# Patient Record
Sex: FEMALE | Race: WHITE | Employment: OTHER | URBAN - METROPOLITAN AREA
[De-identification: names, ages, dates, MRNs, and addresses within clinical notes are randomized per-mention and may not be internally consistent; named-entity substitution may affect disease eponyms.]

---

## 2019-04-25 ENCOUNTER — HOSPITAL ENCOUNTER (OUTPATIENT)
Facility: CLINIC | Age: 69
Setting detail: OBSERVATION
Discharge: HOME | End: 2019-04-26
Attending: EMERGENCY MEDICINE | Admitting: INTERNAL MEDICINE
Payer: MEDICARE

## 2019-04-25 DIAGNOSIS — A41.9 SEPSIS, DUE TO UNSPECIFIED ORGANISM: ICD-10-CM

## 2019-04-25 DIAGNOSIS — E10.10 TYPE 1 DIABETES MELLITUS WITH KETOACIDOSIS WITHOUT COMA (H): Primary | ICD-10-CM

## 2019-04-25 DIAGNOSIS — R73.9 HYPERGLYCEMIA: ICD-10-CM

## 2019-04-25 LAB
ALBUMIN SERPL-MCNC: 3.9 G/DL (ref 3.4–5)
ALBUMIN UR-MCNC: NEGATIVE MG/DL
ALP SERPL-CCNC: 173 U/L (ref 40–150)
ALT SERPL W P-5'-P-CCNC: 21 U/L (ref 0–50)
ANION GAP SERPL CALCULATED.3IONS-SCNC: 15 MMOL/L (ref 3–14)
APPEARANCE UR: CLEAR
AST SERPL W P-5'-P-CCNC: 13 U/L (ref 0–45)
BACTERIA #/AREA URNS HPF: ABNORMAL /HPF
BASE DEFICIT BLDV-SCNC: 3.9 MMOL/L
BASOPHILS # BLD AUTO: 0.1 10E9/L (ref 0–0.2)
BASOPHILS NFR BLD AUTO: 0.2 %
BILIRUB SERPL-MCNC: 0.8 MG/DL (ref 0.2–1.3)
BILIRUB UR QL STRIP: NEGATIVE
BUN SERPL-MCNC: 24 MG/DL (ref 7–30)
CALCIUM SERPL-MCNC: 10 MG/DL (ref 8.5–10.1)
CHLORIDE SERPL-SCNC: 99 MMOL/L (ref 94–109)
CO2 SERPL-SCNC: 19 MMOL/L (ref 20–32)
COLOR UR AUTO: ABNORMAL
CREAT SERPL-MCNC: 0.78 MG/DL (ref 0.52–1.04)
DIFFERENTIAL METHOD BLD: ABNORMAL
EOSINOPHIL # BLD AUTO: 0 10E9/L (ref 0–0.7)
EOSINOPHIL NFR BLD AUTO: 0.2 %
ERYTHROCYTE [DISTWIDTH] IN BLOOD BY AUTOMATED COUNT: 13.3 % (ref 10–15)
GFR SERPL CREATININE-BSD FRML MDRD: 78 ML/MIN/{1.73_M2}
GLUCOSE BLDC GLUCOMTR-MCNC: 134 MG/DL (ref 70–99)
GLUCOSE BLDC GLUCOMTR-MCNC: 274 MG/DL (ref 70–99)
GLUCOSE BLDC GLUCOMTR-MCNC: 295 MG/DL (ref 70–99)
GLUCOSE SERPL-MCNC: 570 MG/DL (ref 70–99)
GLUCOSE UR STRIP-MCNC: >1000 MG/DL
HBA1C MFR BLD: 6.6 % (ref 0–5.6)
HCO3 BLDV-SCNC: 22 MMOL/L (ref 21–28)
HCT VFR BLD AUTO: 44.4 % (ref 35–47)
HGB BLD-MCNC: 15.2 G/DL (ref 11.7–15.7)
HGB UR QL STRIP: ABNORMAL
IMM GRANULOCYTES # BLD: 0.1 10E9/L (ref 0–0.4)
IMM GRANULOCYTES NFR BLD: 0.3 %
INTERPRETATION ECG - MUSE: NORMAL
KETONES BLD-SCNC: 3 MMOL/L (ref 0–0.6)
KETONES UR STRIP-MCNC: 40 MG/DL
LACTATE BLD-SCNC: 1.2 MMOL/L (ref 0.7–2)
LACTATE BLD-SCNC: 2.6 MMOL/L (ref 0.7–2)
LACTATE SERPL-SCNC: 2.5 MMOL/L (ref 0.4–2)
LEUKOCYTE ESTERASE UR QL STRIP: NEGATIVE
LYMPHOCYTES # BLD AUTO: 1.5 10E9/L (ref 0.8–5.3)
LYMPHOCYTES NFR BLD AUTO: 6.6 %
MCH RBC QN AUTO: 32.1 PG (ref 26.5–33)
MCHC RBC AUTO-ENTMCNC: 34.2 G/DL (ref 31.5–36.5)
MCV RBC AUTO: 94 FL (ref 78–100)
MONOCYTES # BLD AUTO: 0.6 10E9/L (ref 0–1.3)
MONOCYTES NFR BLD AUTO: 2.8 %
NEUTROPHILS # BLD AUTO: 19.7 10E9/L (ref 1.6–8.3)
NEUTROPHILS NFR BLD AUTO: 89.9 %
NITRATE UR QL: NEGATIVE
NRBC # BLD AUTO: 0 10*3/UL
NRBC BLD AUTO-RTO: 0 /100
OXYHGB MFR BLDV: 82 %
PCO2 BLDV: 41 MM HG (ref 40–50)
PH BLDV: 7.34 PH (ref 7.32–7.43)
PH UR STRIP: 5 PH (ref 5–7)
PLATELET # BLD AUTO: 287 10E9/L (ref 150–450)
PO2 BLDV: 52 MM HG (ref 25–47)
POTASSIUM SERPL-SCNC: 4.3 MMOL/L (ref 3.4–5.3)
PROT SERPL-MCNC: 7.8 G/DL (ref 6.8–8.8)
RBC # BLD AUTO: 4.74 10E12/L (ref 3.8–5.2)
RBC #/AREA URNS AUTO: 1 /HPF (ref 0–2)
SODIUM SERPL-SCNC: 133 MMOL/L (ref 133–144)
SOURCE: ABNORMAL
SP GR UR STRIP: 1.02 (ref 1–1.03)
SQUAMOUS #/AREA URNS AUTO: <1 /HPF (ref 0–1)
UROBILINOGEN UR STRIP-MCNC: NORMAL MG/DL (ref 0–2)
WBC # BLD AUTO: 21.8 10E9/L (ref 4–11)
WBC #/AREA URNS AUTO: <1 /HPF (ref 0–5)

## 2019-04-25 PROCEDURE — 93005 ELECTROCARDIOGRAM TRACING: CPT

## 2019-04-25 PROCEDURE — 25000132 ZZH RX MED GY IP 250 OP 250 PS 637: Mod: GY | Performed by: INTERNAL MEDICINE

## 2019-04-25 PROCEDURE — 83605 ASSAY OF LACTIC ACID: CPT | Performed by: INTERNAL MEDICINE

## 2019-04-25 PROCEDURE — 96361 HYDRATE IV INFUSION ADD-ON: CPT

## 2019-04-25 PROCEDURE — 96365 THER/PROPH/DIAG IV INF INIT: CPT

## 2019-04-25 PROCEDURE — 25000131 ZZH RX MED GY IP 250 OP 636 PS 637: Performed by: EMERGENCY MEDICINE

## 2019-04-25 PROCEDURE — 82010 KETONE BODYS QUAN: CPT | Performed by: EMERGENCY MEDICINE

## 2019-04-25 PROCEDURE — 25800030 ZZH RX IP 258 OP 636: Performed by: INTERNAL MEDICINE

## 2019-04-25 PROCEDURE — 85025 COMPLETE CBC W/AUTO DIFF WBC: CPT | Performed by: EMERGENCY MEDICINE

## 2019-04-25 PROCEDURE — 83605 ASSAY OF LACTIC ACID: CPT | Performed by: EMERGENCY MEDICINE

## 2019-04-25 PROCEDURE — 81001 URINALYSIS AUTO W/SCOPE: CPT | Performed by: EMERGENCY MEDICINE

## 2019-04-25 PROCEDURE — 99285 EMERGENCY DEPT VISIT HI MDM: CPT | Mod: 25

## 2019-04-25 PROCEDURE — 99219 ZZC INITIAL OBSERVATION CARE,LEVL II: CPT | Performed by: INTERNAL MEDICINE

## 2019-04-25 PROCEDURE — 99207 ZZC CDG-CODE CATEGORY CHANGED: CPT | Performed by: INTERNAL MEDICINE

## 2019-04-25 PROCEDURE — 36415 COLL VENOUS BLD VENIPUNCTURE: CPT | Performed by: INTERNAL MEDICINE

## 2019-04-25 PROCEDURE — 83036 HEMOGLOBIN GLYCOSYLATED A1C: CPT | Performed by: EMERGENCY MEDICINE

## 2019-04-25 PROCEDURE — 25000128 H RX IP 250 OP 636: Performed by: EMERGENCY MEDICINE

## 2019-04-25 PROCEDURE — 87040 BLOOD CULTURE FOR BACTERIA: CPT | Mod: 91 | Performed by: EMERGENCY MEDICINE

## 2019-04-25 PROCEDURE — 12000000 ZZH R&B MED SURG/OB

## 2019-04-25 PROCEDURE — 25000131 ZZH RX MED GY IP 250 OP 636 PS 637: Performed by: INTERNAL MEDICINE

## 2019-04-25 PROCEDURE — 25000128 H RX IP 250 OP 636: Performed by: INTERNAL MEDICINE

## 2019-04-25 PROCEDURE — 80053 COMPREHEN METABOLIC PANEL: CPT | Performed by: EMERGENCY MEDICINE

## 2019-04-25 PROCEDURE — 96372 THER/PROPH/DIAG INJ SC/IM: CPT

## 2019-04-25 PROCEDURE — 00000146 ZZHCL STATISTIC GLUCOSE BY METER IP

## 2019-04-25 PROCEDURE — 82805 BLOOD GASES W/O2 SATURATION: CPT | Performed by: EMERGENCY MEDICINE

## 2019-04-25 RX ORDER — LATANOPROST 50 UG/ML
1 SOLUTION/ DROPS OPHTHALMIC AT BEDTIME
Status: DISCONTINUED | OUTPATIENT
Start: 2019-04-25 | End: 2019-04-26 | Stop reason: HOSPADM

## 2019-04-25 RX ORDER — NALOXONE HYDROCHLORIDE 0.4 MG/ML
.1-.4 INJECTION, SOLUTION INTRAMUSCULAR; INTRAVENOUS; SUBCUTANEOUS
Status: DISCONTINUED | OUTPATIENT
Start: 2019-04-25 | End: 2019-04-26 | Stop reason: HOSPADM

## 2019-04-25 RX ORDER — PROCHLORPERAZINE MALEATE 5 MG
5 TABLET ORAL EVERY 6 HOURS PRN
Status: DISCONTINUED | OUTPATIENT
Start: 2019-04-25 | End: 2019-04-26 | Stop reason: HOSPADM

## 2019-04-25 RX ORDER — CEFTRIAXONE 2 G/1
2 INJECTION, POWDER, FOR SOLUTION INTRAMUSCULAR; INTRAVENOUS ONCE
Status: COMPLETED | OUTPATIENT
Start: 2019-04-25 | End: 2019-04-25

## 2019-04-25 RX ORDER — MONTELUKAST SODIUM 4 MG/1
1 TABLET, CHEWABLE ORAL 2 TIMES DAILY
Status: DISCONTINUED | OUTPATIENT
Start: 2019-04-25 | End: 2019-04-26 | Stop reason: HOSPADM

## 2019-04-25 RX ORDER — DEXTROSE MONOHYDRATE 25 G/50ML
25-50 INJECTION, SOLUTION INTRAVENOUS
Status: DISCONTINUED | OUTPATIENT
Start: 2019-04-25 | End: 2019-04-26 | Stop reason: HOSPADM

## 2019-04-25 RX ORDER — SODIUM CHLORIDE 9 MG/ML
INJECTION, SOLUTION INTRAVENOUS CONTINUOUS
Status: DISCONTINUED | OUTPATIENT
Start: 2019-04-25 | End: 2019-04-25 | Stop reason: CLARIF

## 2019-04-25 RX ORDER — LATANOPROST 50 UG/ML
1 SOLUTION/ DROPS OPHTHALMIC AT BEDTIME
COMMUNITY

## 2019-04-25 RX ORDER — ASPIRIN 81 MG/1
81 TABLET ORAL DAILY
Status: DISCONTINUED | OUTPATIENT
Start: 2019-04-26 | End: 2019-04-26 | Stop reason: HOSPADM

## 2019-04-25 RX ORDER — LISINOPRIL 2.5 MG/1
2.5 TABLET ORAL DAILY
Status: DISCONTINUED | OUTPATIENT
Start: 2019-04-26 | End: 2019-04-26 | Stop reason: HOSPADM

## 2019-04-25 RX ORDER — ONDANSETRON 4 MG/1
4 TABLET, ORALLY DISINTEGRATING ORAL EVERY 6 HOURS PRN
Status: DISCONTINUED | OUTPATIENT
Start: 2019-04-25 | End: 2019-04-26

## 2019-04-25 RX ORDER — DEXTROSE MONOHYDRATE, SODIUM CHLORIDE, AND POTASSIUM CHLORIDE 50; 1.49; 4.5 G/1000ML; G/1000ML; G/1000ML
INJECTION, SOLUTION INTRAVENOUS CONTINUOUS
Status: DISCONTINUED | OUTPATIENT
Start: 2019-04-25 | End: 2019-04-26

## 2019-04-25 RX ORDER — ONDANSETRON 2 MG/ML
4 INJECTION INTRAMUSCULAR; INTRAVENOUS EVERY 6 HOURS PRN
Status: DISCONTINUED | OUTPATIENT
Start: 2019-04-25 | End: 2019-04-26

## 2019-04-25 RX ORDER — ONDANSETRON 2 MG/ML
4 INJECTION INTRAMUSCULAR; INTRAVENOUS EVERY 6 HOURS PRN
Status: DISCONTINUED | OUTPATIENT
Start: 2019-04-25 | End: 2019-04-26 | Stop reason: HOSPADM

## 2019-04-25 RX ORDER — NICOTINE POLACRILEX 4 MG
15-30 LOZENGE BUCCAL
Status: DISCONTINUED | OUTPATIENT
Start: 2019-04-25 | End: 2019-04-26 | Stop reason: HOSPADM

## 2019-04-25 RX ORDER — ONDANSETRON 4 MG/1
4 TABLET, ORALLY DISINTEGRATING ORAL EVERY 6 HOURS PRN
Status: DISCONTINUED | OUTPATIENT
Start: 2019-04-25 | End: 2019-04-26 | Stop reason: HOSPADM

## 2019-04-25 RX ORDER — PROCHLORPERAZINE 25 MG
12.5 SUPPOSITORY, RECTAL RECTAL EVERY 12 HOURS PRN
Status: DISCONTINUED | OUTPATIENT
Start: 2019-04-25 | End: 2019-04-26 | Stop reason: HOSPADM

## 2019-04-25 RX ADMIN — LATANOPROST 1 DROP: 50 SOLUTION/ DROPS OPHTHALMIC at 21:35

## 2019-04-25 RX ADMIN — SODIUM CHLORIDE 1000 ML: 9 INJECTION, SOLUTION INTRAVENOUS at 15:11

## 2019-04-25 RX ADMIN — SODIUM CHLORIDE 1000 ML: 9 INJECTION, SOLUTION INTRAVENOUS at 12:12

## 2019-04-25 RX ADMIN — INSULIN DETEMIR 10 UNITS: 100 INJECTION, SOLUTION SUBCUTANEOUS at 21:35

## 2019-04-25 RX ADMIN — SODIUM CHLORIDE: 9 INJECTION, SOLUTION INTRAVENOUS at 19:57

## 2019-04-25 RX ADMIN — CEFTRIAXONE SODIUM 2 G: 2 INJECTION, POWDER, FOR SOLUTION INTRAMUSCULAR; INTRAVENOUS at 13:18

## 2019-04-25 RX ADMIN — ONDANSETRON 4 MG: 2 INJECTION INTRAMUSCULAR; INTRAVENOUS at 21:40

## 2019-04-25 RX ADMIN — SODIUM CHLORIDE 1000 ML: 9 INJECTION, SOLUTION INTRAVENOUS at 13:19

## 2019-04-25 RX ADMIN — POTASSIUM CHLORIDE, DEXTROSE MONOHYDRATE AND SODIUM CHLORIDE: 150; 5; 450 INJECTION, SOLUTION INTRAVENOUS at 23:33

## 2019-04-25 RX ADMIN — HUMAN INSULIN 10 UNITS: 100 INJECTION, SOLUTION SUBCUTANEOUS at 13:56

## 2019-04-25 ASSESSMENT — ENCOUNTER SYMPTOMS
FREQUENCY: 1
FATIGUE: 1
VOMITING: 1
NAUSEA: 1

## 2019-04-25 ASSESSMENT — ACTIVITIES OF DAILY LIVING (ADL)
RETIRED_EATING: 0-->INDEPENDENT
COGNITION: 0 - NO COGNITION ISSUES REPORTED
DRESS: 0-->INDEPENDENT
ADLS_ACUITY_SCORE: 14
SWALLOWING: 0-->SWALLOWS FOODS/LIQUIDS WITHOUT DIFFICULTY
AMBULATION: 0-->INDEPENDENT
TRANSFERRING: 0-->INDEPENDENT
FALL_HISTORY_WITHIN_LAST_SIX_MONTHS: NO
RETIRED_COMMUNICATION: 0-->UNDERSTANDS/COMMUNICATES WITHOUT DIFFICULTY
TOILETING: 0-->INDEPENDENT
BATHING: 0-->INDEPENDENT

## 2019-04-25 ASSESSMENT — MIFFLIN-ST. JEOR: SCORE: 1097.88

## 2019-04-25 NOTE — PHARMACY-ADMISSION MEDICATION HISTORY
Admission medication history interview status for the 4/25/2019  admission is complete. See EPIC admission navigator for prior to admission medications     Medication history source reliability:Good    Actions taken by pharmacist (provider contacted, etc):None     Additional medication history information not noted on PTA med list :None    Medication reconciliation/reorder completed by provider prior to medication history? No    Time spent in this activity: 30 minutes    Prior to Admission medications    Medication Sig Last Dose Taking? Auth Provider   aspirin 81 MG tablet Take 81 mg by mouth daily  4/24/2019 at Unknown time Yes Reported, Patient   colestipol (COLESTID) 1 G tablet Take 1 g by mouth 2 times daily 4/24/2019 at Unknown time Yes Reported, Patient   insulin aspart (NOVOLOG VIAL) SOLN 100 UNIT/ML Inject Subcutaneous See Admin Instructions  Yes Reported, Patient   latanoprost (XALATAN) 0.005 % ophthalmic solution Place 1 drop into both eyes At Bedtime 4/24/2019 at Unknown time Yes Unknown, Entered By History   lisinopril (ZESTRIL) 2.5 MG tablet Take 2.5 mg by mouth daily 4/24/2019 at Unknown time Yes Reported, Patient

## 2019-04-25 NOTE — ED NOTES
"[facility]  ED Nurse Handoff Report    ED Chief complaint: Hyperglycemia (Insulin pump is not working. )      ED Diagnosis:   Final diagnoses:   Hyperglycemia   Sepsis, due to unspecified organism (H)       Code Status: Full Code    Allergies:   Allergies   Allergen Reactions     Sulfamethoxazole-Trimethoprim      myalgia     Glucosamine-Chondroitin Nausea and Vomiting     Makes patient extremely sick       Activity level - Baseline/Home:  Independent    Activity Level - Current:   Independent     Needed?: No    Isolation: No  Infection: Not Applicable  Bariatric?: No    Vital Signs:   Vitals:    04/25/19 1146 04/25/19 1230 04/25/19 1245   BP: (!) 190/102 146/78    Pulse:  100    Resp: 16     Temp: 97.7  F (36.5  C)     TempSrc: Oral     SpO2: 96%  93%   Weight: 56.7 kg (125 lb)     Height: 1.651 m (5' 5\")         Cardiac Rhythm: ,        Pain level:      Is this patient confused?: No   Does this patient have a guardian?  No         If yes, is there guardianship documents in the Epic \"Code/ACP\" activity?  N/A         Guardian Notified?  N/A  [place] - Suicide Severity Rating Scale Completed?  Yes  If yes, what color did the patient score?  White    Patient Report: Initial Complaint: hyperglycemia  Focused Assessment:Rosa Maria Khan is a 68 year old female who presents with hyperglycemia.  The patient is a type I diabetic, and has an insulin pump.  She states that she change the cartridge last night, but does not think is working because all of her readings have been high.  She states she has been urinating frequently today, and that she is also vomited a couple times.  She feels terrible she states, but no focal pain.  Symptoms are constant, moderate in severity, nonradiating.  She denies any fevers, denies any chest pain.  States that since having had the insulin pump placed, she had no issues with DKA.    Tests Performed: labs  Abnormal Results:   Labs Ordered and Resulted from Time of " ED Arrival Up to the Time of Departure from the ED   CBC WITH PLATELETS DIFFERENTIAL - Abnormal; Notable for the following components:       Result Value    WBC 21.8 (*)     Absolute Neutrophil 19.7 (*)     All other components within normal limits   COMPREHENSIVE METABOLIC PANEL - Abnormal; Notable for the following components:    Carbon Dioxide 19 (*)     Anion Gap 15 (*)     Glucose 570 (*)     Alkaline Phosphatase 173 (*)     All other components within normal limits   LACTIC ACID WHOLE BLOOD - Abnormal; Notable for the following components:    Lactic Acid 2.6 (*)     All other components within normal limits   BLOOD GAS VENOUS AND OXYHGB - Abnormal; Notable for the following components:    PO2 Venous 52 (*)     All other components within normal limits   KETONE BETA-HYDROXYBUTYRATE QUANTITATIVE - Abnormal; Notable for the following components:    Ketone Quantitative 3.0 (*)     All other components within normal limits   ROUTINE UA WITH MICROSCOPIC - Abnormal; Notable for the following components:    Glucose Urine >1000 (*)     Ketones Urine 40 (*)     Blood Urine Trace (*)     Bacteria Urine Few (*)     All other components within normal limits   BLOOD CULTURE   BLOOD CULTURE       Treatments provided: Rocephin, Insulin, NS bolus,     Family Comments: na    OBS brochure/video discussed/provided to patient/family: N/A              Name of person given brochure if not patient: na              Relationship to patient: na    ED Medications:   Medications   0.9% sodium chloride BOLUS (1,000 mLs Intravenous New Bag 4/25/19 1212)     Followed by   0.9% sodium chloride BOLUS (1,000 mLs Intravenous New Bag 4/25/19 1319)   cefTRIAXone (ROCEPHIN) 2 g vial to attach to  ml bag for ADULTS or NS 50 ml bag for PEDS (2 g Intravenous New Bag 4/25/19 1318)   insulin regular (HumuLIN R/NovoLIN R VIAL) injection 10 Units (has no administration in time range)       Drips infusing?:  No    For the majority of the shift this  patient was Green.   Interventions performed were none.    Severe Sepsis OR Septic Shock Diagnosis Present: yes      To be done/followed up on inpatient unit:  close monitoring    ED NURSE PHONE NUMBER: [phone]

## 2019-04-25 NOTE — PROGRESS NOTES
RECEIVING UNIT ED HANDOFF REVIEW    ED Nurse Handoff Report was reviewed by: Florida Turk on April 25, 2019 at 2:13 PM

## 2019-04-25 NOTE — ED TRIAGE NOTES
Blood sugar has been greater than 500 at home, believes her insulin pump is not working since she replaced insulin cartridge last night. Blood sugar has been keep going up. Visiting from [place].

## 2019-04-25 NOTE — ED PROVIDER NOTES
History     Chief Complaint:  Hyperglycemia (Insulin pump is not working. )       TONY Khan is a 68 year old female who presents with hyperglycemia.  The patient is a type I diabetic, and has an insulin pump.  She states that she change the cartridge last night, but does not think is working because all of her readings have been high.  She states she has been urinating frequently today, and that she is also vomited a couple times.  She feels terrible she states, but no focal pain.  Symptoms are constant, moderate in severity, nonradiating.  She denies any fevers, denies any chest pain.  States that since having had the insulin pump placed, she had no issues with DKA.    Allergies:  Sulfamethoxazole-Trimethoprim  Glucosamine-Chondroitin     Medications:      aspirin 81 MG tablet   buPROPion (WELLBUTRIN XL) 150 MG 24 hr tablet   busPIRone (BUSPAR) 5 MG tablet   citalopram (CELEXA) 20 MG tablet   clobetasol (TEMOVATE) 0.05 % external solution   colestipol (COLESTID) 1 G tablet   Fish Oil-Cholecalciferol (FISH OIL + D3) 0243-2395 MG-UNIT CAPS   insulin aspart (NOVOLOG VIAL) SOLN 100 UNIT/ML   insulin glargine (LANTUS VIAL) SOLN 100 UNIT/ML   levothyroxine (SYNTHROID) 100 MCG tablet   lisinopril (ZESTRIL) 2.5 MG tablet   minoxidil (TGT MINOXIDIL HAIR REGROWTH) 5 % SOLN   Multiple Minerals-Vitamins (CALCIUM & VIT D3 BONE HEALTH PO)   tretinoin (RETIN-A) 0.025 % cream       Past Medical History:    No past medical history on file.    Patient Active Problem List    Diagnosis Date Noted     Alopecia 04/18/2014     Priority: Medium     Problem list name updated by automated process. Provider to review          Past Surgical History:    No past surgical history on file.     Family History:    family history includes Cancer in her mother and another family member; Cancer - colorectal in her brother; Diabetes in her brother; Parkinsonism in her father; Thyroid Disease in her father and mother.    Social History:    "reports that she has been smoking.  She has never used smokeless tobacco. She reports that she drinks alcohol. She reports that she does not use drugs.    PCP: No primary care provider on file.     Review of Systems   Constitutional: Positive for fatigue.   Gastrointestinal: Positive for nausea and vomiting.   Genitourinary: Positive for frequency.   All other systems reviewed and are negative.        Physical Exam     Patient Vitals for the past 24 hrs:   BP Temp Temp src Heart Rate Resp SpO2 Height Weight   04/25/19 1146 (!) 190/102 97.7  F (36.5  C) Oral 120 16 96 % 1.651 m (5' 5\") 56.7 kg (125 lb)        Physical Exam   Vitals: reviewed by me  General: Pt seen on [facility], pleasant, cooperative, and alert to conversation.  + sick appearing, non toxic   Eyes: Tracking well, clear conjunctiva BL  ENT: MMM, midline trachea.   Lungs:   No tachypnea, no accessory muscle use. No respiratory distress.   CV: Rate as above, regular rhythm.    Abd: Soft, non tender, no guarding, no rebound. Non distended  MSK: no peripheral edema or joint effusion.  No evidence of trauma  Skin: No rash, normal turgor and temperature  Neuro: Clear speech and no facial droop.  Psych: Not RIS, no e/o AH/VH      Emergency Department Course     ECG   Sinus tachycardia, normal intervals, no ST changes, no ectopy.      Laboratory:  Labs Ordered and Resulted from Time of ED Arrival Up to the Time of Departure from the ED   CBC WITH PLATELETS DIFFERENTIAL - Abnormal; Notable for the following components:       Result Value    WBC 21.8 (*)     Absolute Neutrophil 19.7 (*)     All other components within normal limits   COMPREHENSIVE METABOLIC PANEL - Abnormal; Notable for the following components:    Carbon Dioxide 19 (*)     Anion Gap 15 (*)     Glucose 570 (*)     Alkaline Phosphatase 173 (*)     All other components within normal limits   LACTIC ACID WHOLE BLOOD - Abnormal; Notable for the following components:    Lactic Acid 2.6 (*)     " All other components within normal limits   BLOOD GAS VENOUS AND OXYHGB - Abnormal; Notable for the following components:    PO2 Venous 52 (*)     All other components within normal limits   KETONE BETA-HYDROXYBUTYRATE QUANTITATIVE - Abnormal; Notable for the following components:    Ketone Quantitative 3.0 (*)     All other components within normal limits   ROUTINE UA WITH MICROSCOPIC - Abnormal; Notable for the following components:    Glucose Urine >1000 (*)     Ketones Urine 40 (*)     Blood Urine Trace (*)     Bacteria Urine Few (*)     All other components within normal limits   BLOOD CULTURE   BLOOD CULTURE        Interventions:  Medications   0.9% sodium chloride BOLUS (1,000 mLs Intravenous New Bag 4/25/19 1212)     Followed by   0.9% sodium chloride BOLUS (has no administration in time range)        Impression & Plan         Medical Decision Making:  This is a very pleasant but unfortunate 68-year-old female presents the emergency room with appears to be hyperglycemia, possibly sepsis.  She states she feels unwell, and has several critical values, likely stemming from a malfunctioning insulin pump.  Her glucose is critically high, she has critically high ketones as well, and an elevated lactate and white count and heart rate.  If these reasons, I did elect to treat empirically for sepsis, with the again she is afebrile, mentating appropriately, and has stabilizing vitals here after IV fluids only.  The main issue does appear to be her insulin pump, and all of her symptoms can be explained by hyperglycemia.  She has no evidence of acidosis yet apart from a slightly low bicarb, but a normal pH.  After speaking with the hospitalist, we did decide to give an insulin dose here as well as copious IV fluids, as again patient is not sick enough to require an IV insulin drip as yet.  She will be monitored very carefully, and be admitted to medical floor for continued monitoring and treatment of her hyperglycemia,  and monitoring for any type of fever or source for a septic infection.  Patient is okay with this plan, resting comfortably now, no pain, okay for an abdomen as above.      Critical Care time:  was 35 minutes for this patient excluding procedures.    Diagnosis:    ICD-10-CM    1. Hyperglycemia R73.9 Comprehensive metabolic panel     UA with Microscopic   2. Sepsis, due to unspecified organism (H) A41.9         Discharge Medications:  New Prescriptions    No medications on file        4/25/2019   Dimas Soliman*        Dimas Soliman MD  04/25/19 1318

## 2019-04-26 VITALS
BODY MASS INDEX: 20.83 KG/M2 | HEART RATE: 100 BPM | DIASTOLIC BLOOD PRESSURE: 74 MMHG | TEMPERATURE: 97 F | RESPIRATION RATE: 18 BRPM | SYSTOLIC BLOOD PRESSURE: 169 MMHG | WEIGHT: 125 LBS | HEIGHT: 65 IN | OXYGEN SATURATION: 96 %

## 2019-04-26 PROBLEM — R73.9 HYPERGLYCEMIA: Status: ACTIVE | Noted: 2019-04-26

## 2019-04-26 LAB
ANION GAP SERPL CALCULATED.3IONS-SCNC: 10 MMOL/L (ref 3–14)
BUN SERPL-MCNC: 17 MG/DL (ref 7–30)
CALCIUM SERPL-MCNC: 8.5 MG/DL (ref 8.5–10.1)
CHLORIDE SERPL-SCNC: 108 MMOL/L (ref 94–109)
CO2 SERPL-SCNC: 21 MMOL/L (ref 20–32)
CREAT SERPL-MCNC: 0.65 MG/DL (ref 0.52–1.04)
ERYTHROCYTE [DISTWIDTH] IN BLOOD BY AUTOMATED COUNT: 13.5 % (ref 10–15)
GFR SERPL CREATININE-BSD FRML MDRD: >90 ML/MIN/{1.73_M2}
GLUCOSE BLDC GLUCOMTR-MCNC: 181 MG/DL (ref 70–99)
GLUCOSE BLDC GLUCOMTR-MCNC: 239 MG/DL (ref 70–99)
GLUCOSE BLDC GLUCOMTR-MCNC: 248 MG/DL (ref 70–99)
GLUCOSE BLDC GLUCOMTR-MCNC: 299 MG/DL (ref 70–99)
GLUCOSE BLDC GLUCOMTR-MCNC: 355 MG/DL (ref 70–99)
GLUCOSE BLDC GLUCOMTR-MCNC: 474 MG/DL (ref 70–99)
GLUCOSE SERPL-MCNC: 209 MG/DL (ref 70–99)
HCT VFR BLD AUTO: 37.4 % (ref 35–47)
HGB BLD-MCNC: 13 G/DL (ref 11.7–15.7)
KETONES UR STRIP-MCNC: 10 MG/DL
MCH RBC QN AUTO: 32.2 PG (ref 26.5–33)
MCHC RBC AUTO-ENTMCNC: 34.8 G/DL (ref 31.5–36.5)
MCV RBC AUTO: 93 FL (ref 78–100)
PLATELET # BLD AUTO: 241 10E9/L (ref 150–450)
POTASSIUM SERPL-SCNC: 3.9 MMOL/L (ref 3.4–5.3)
RBC # BLD AUTO: 4.04 10E12/L (ref 3.8–5.2)
SODIUM SERPL-SCNC: 139 MMOL/L (ref 133–144)
WBC # BLD AUTO: 12.7 10E9/L (ref 4–11)

## 2019-04-26 PROCEDURE — 25000132 ZZH RX MED GY IP 250 OP 250 PS 637: Performed by: INTERNAL MEDICINE

## 2019-04-26 PROCEDURE — 25000131 ZZH RX MED GY IP 250 OP 636 PS 637: Performed by: INTERNAL MEDICINE

## 2019-04-26 PROCEDURE — 36415 COLL VENOUS BLD VENIPUNCTURE: CPT | Performed by: INTERNAL MEDICINE

## 2019-04-26 PROCEDURE — 99217 ZZC OBSERVATION CARE DISCHARGE: CPT | Performed by: INTERNAL MEDICINE

## 2019-04-26 PROCEDURE — 00000146 ZZHCL STATISTIC GLUCOSE BY METER IP

## 2019-04-26 PROCEDURE — 85027 COMPLETE CBC AUTOMATED: CPT | Performed by: INTERNAL MEDICINE

## 2019-04-26 PROCEDURE — G0378 HOSPITAL OBSERVATION PER HR: HCPCS

## 2019-04-26 PROCEDURE — A9270 NON-COVERED ITEM OR SERVICE: HCPCS | Performed by: INTERNAL MEDICINE

## 2019-04-26 PROCEDURE — 80048 BASIC METABOLIC PNL TOTAL CA: CPT | Performed by: INTERNAL MEDICINE

## 2019-04-26 PROCEDURE — 81003 URINALYSIS AUTO W/O SCOPE: CPT | Performed by: INTERNAL MEDICINE

## 2019-04-26 RX ORDER — ONDANSETRON 4 MG/1
4-8 TABLET, ORALLY DISINTEGRATING ORAL EVERY 8 HOURS PRN
Qty: 20 TABLET | Refills: 0 | Status: SHIPPED | OUTPATIENT
Start: 2019-04-26

## 2019-04-26 RX ADMIN — MONTELUKAST SODIUM 1 G: 4 TABLET, CHEWABLE ORAL at 08:00

## 2019-04-26 RX ADMIN — LISINOPRIL 2.5 MG: 2.5 TABLET ORAL at 08:00

## 2019-04-26 RX ADMIN — INSULIN ASPART 7 UNITS: 100 INJECTION, SOLUTION INTRAVENOUS; SUBCUTANEOUS at 12:15

## 2019-04-26 RX ADMIN — ASPIRIN 81 MG: 81 TABLET, COATED ORAL at 08:00

## 2019-04-26 RX ADMIN — INSULIN DETEMIR 10 UNITS: 100 INJECTION, SOLUTION SUBCUTANEOUS at 08:00

## 2019-04-26 RX ADMIN — INSULIN ASPART 2 UNITS: 100 INJECTION, SOLUTION INTRAVENOUS; SUBCUTANEOUS at 07:53

## 2019-04-26 ASSESSMENT — ACTIVITIES OF DAILY LIVING (ADL)
ADLS_ACUITY_SCORE: 14

## 2019-04-26 NOTE — PLAN OF CARE
AOx4, up independently in room. Tolerating Mod carb diet, BGM- blood sugars in 200's, sliding scale insulin given. Lactic decreased from 2.5 to 1.2. D5 1/2 NS + 20K at 50ml/hr. VSS ex HTN and tachy at times. 1 emesis, gave Zofran x1 with relief. BS active (+) flatus, 1 BM on shift. Discharge possibly today.

## 2019-04-26 NOTE — PROGRESS NOTES
[facility]    Hospitalist Progress Note    Date of Service (when I saw the patient): 04/26/2019    Assessment & Plan   Rosa Maria Khan is a 68 year old female who was admitted on 4/25/2019.  ASSESSMENT:  Rosa Maria Khan is a type 1 diabetic, who likely has a pump failure in mild DKA, admitted for further stabilization of her blood glucose and with hyperglycemia, ketonemia, leukocytosis and glucosuria.      PLAN:   1.  Mild DKA.  The patient denies any current infectious symptoms.  She has not been sick until her pump failure.  She has had prior problems with the pump and primarily it is usually either the tubing from the insulin pump or the connection.  The patient does not have any other insulin supply right now, and she has to get back to her home in [place].  The patient was started on Levemir 10 units b.i.d.  Blood sugars in the 200s today except this afternoon value at 474   stopped D5 in IVF   Added novolog 5units TID   On sliding scale insulin   Monitor blood sugars closely if controlled in 200s will discharge home today versus tomorrow   2. Leucocytosis:  Due to dehydration   Improved today           DVT Prophylaxis: Ambulate every shift  Code Status: Full Code    Disposition: Expected discharge  Later today versus tomorrow   Esme Benson MD  [phone] (P)      Interval History   Doing well. No new complaints today     -Data reviewed today: I reviewed all new labs and imaging results over the last 24 hours. I personally reviewed no images or EKG's today.    Physical Exam   Temp: 95.4  F (35.2  C) Temp src: Oral BP: 140/66 Pulse: 100 Heart Rate: 97 Resp: 16 SpO2: 96 % O2 Device: None (Room air)    Vitals:    04/25/19 1146   Weight: 56.7 kg (125 lb)     Vital Signs with Ranges  Temp:  [95.4  F (35.2  C)-98.8  F (37.1  C)] 95.4  F (35.2  C)  Pulse:  [100-105] 100  Heart Rate:  [] 97  Resp:  [16-20] 16  BP: (124-170)/(51-94) 140/66  SpO2:  [96 %-97 %] 96 %  I/O last 3 completed  shifts:  In: 2100 [IV Piggyback:2100]  Out: 1 [Emesis/NG output:1]    Constitutional: Awake, alert, cooperative, no apparent distress  Respiratory: Clear to auscultation bilaterally, no crackles or wheezing  Cardiovascular: Regular rate and rhythm, normal S1 and S2, and no murmur noted  GI: Normal bowel sounds, soft, non-distended, non-tender  Skin/Integumen: No rashes, no cyanosis, no edema  Other:     Medications       aspirin  81 mg Oral Daily     colestipol  1 g Oral BID     insulin aspart  5 Units Subcutaneous TID w/meals     insulin aspart  1-7 Units Subcutaneous TID AC     insulin aspart  1-5 Units Subcutaneous At Bedtime     insulin detemir  10 Units Subcutaneous BID     latanoprost  1 drop Both Eyes At Bedtime     lisinopril  2.5 mg Oral Daily       Data   Recent Labs   Lab 04/26/19  0648 04/25/19  1212   WBC 12.7* 21.8*   HGB 13.0 15.2   MCV 93 94    287    133   POTASSIUM 3.9 4.3   CHLORIDE 108 99   CO2 21 19*   BUN 17 24   CR 0.65 0.78   ANIONGAP 10 15*   NASIR 8.5 10.0   * 570*   ALBUMIN  --  3.9   PROTTOTAL  --  7.8   BILITOTAL  --  0.8   ALKPHOS  --  173*   ALT  --  21   AST  --  13       No results found for this or any previous visit (from the past 24 hour(s)).

## 2019-04-26 NOTE — UTILIZATION REVIEW
"Admission Status; Secondary Review Determination     Under the authority of the Utilization Management Committee, the utilization review process indicated a secondary review on the above patient.  The review outcome is based on review of the medical records, discussions with staff, and applying clinical experience noted on the date of the review.       (x) Observation Status Appropriate - This patient does not meet hospital inpatient criteria and is placed in observation status. If this patient's primary payer is Medicare and was admitted as an inpatient, Condition Code 44 should be used and patient status changed to \"observation\".     RATIONALE FOR DETERMINATION: 68-year-old type I diabetic who [place] when she changed her cartridge and her insulin pump and woke up the next morning with mild DKA secondary to pump failure.  Treatment plan is for intravenous fluids electrolyte management and starting patient on an insulin regimen to stabilize her blood sugars until she travels back home and have her malfunctioning insulin pump repaired.  Observation care appropriate for diabetes stabilization and preparing patient for discharge.  Case discussed with Dr. Benson.      The severity of illness, intensity of service provided, expected LOS and risk for adverse outcome make the care appropriate for further observation; however, doesn't meet criteria for hospital inpatient admission. This was discussed with attending physician who concurred with this determination.    The information on this document is developed by the utilization review team in order for the business office to ensure compliance.  This only denotes the appropriateness of proper admission status and does not reflect the quality of care rendered.         The definitions of Inpatient Status and Observation Status used in making the determination above are those provided in the CMS Coverage Manual, Chapter 1 and Chapter 6, section 70.4.    "   Sincerely,     Charlie Herrmann MD    Physician Advisor  Utilization Review/ Case Management  [employer].

## 2019-04-26 NOTE — PROGRESS NOTES
"Writer met with the patient.  Patient is A+Ox4 independent in the room.  Explained \"what is outpatient observation.\" Answered all questions.  Patient has a PCP and will follow up with them regarding this hospitalization. Patient identified no further needs for discharge.   "

## 2019-04-26 NOTE — H&P
Admitted:     04/25/2019      PRIMARY CARE PROVIDER:  Kitty Jacinto PA-C, at [place], [place].        CHIEF COMPLAINT:  Elevated blood glucose with increased urination and weakness and vomiting.      HISTORY OF PRESENT ILLNESS:  Rosa Maria Khan is a type 1 diabetic, who is living in [place], who was down into the [facility] on a visit.  The patient [place], went to bed at 1 o'clock, and she did change her insulin pump to a new cartridge.  When she woke up this morning, her blood glucose was high and she thinks that the tubing is not working properly.  The patient was urinating quite frequently today.  She also vomited a couple of times and she feels terrible without any focal pain.  The patient denies any fevers, chest pain or abdominal pain.  The patient came to [facility] for further assessment.      In the emergency room, the patient was seen by Dr. Sunday Soliman.  Blood glucose was 570.  She had 3 of ketones, lactic acid was mildly elevated at 2.6.  Sodium was 133, potassium 4.3, bicarb was slightly low at 19.  Alkaline phosphatase was 173.  White count was elevated at 21,800, hemoglobin 15.2, platelets 287,000.  Venous blood gas showed pH of 7.34, pCO2 of 41, pO2 of 52.  Blood cultures were obtained.  Blood glucose, after hydration and 10 units of NovoLog, came down to 295.  Urinalysis had greater than 1000 glucose and 40 of ketones.  The patient is being admitted for very mild DKA.      PAST MEDICAL HISTORY:   1.  Type 1 diabetes.   2.  Alopecia.   3.  Hypothyroidism.   4.  Hypokalemia.   5.  History of perforated appendicitis.   6.  Hypertension.   7.  Hyperlipidemia.      PAST SURGICAL HISTORY:  Laparoscopic appendectomy.      FAMILY HISTORY:  Maternal uncle with diabetes, mother with arthritis and vision loss.      SOCIAL HISTORY:  She smokes a half a pack a day.  Occasional alcohol.      ALLERGIES:  GLUCOSAMINE CHONDROITIN, SULFAMETHOXAZOLE AND BUPROPION.      CURRENT  MEDICATIONS:   1.  Fish oil 200 mg daily.   2.  Folic acid 400 mcg daily.   3.  Vitamin E 400 units daily.   4.  Aspirin 81 mg once a day.   5.  Potassium bicarbonate 99 mg twice a day.   6.  Calcium citrate with vitamin D 1 tablet twice a day.   7.  Multivitamin 1 tab once a day.   8.  Benadryl with Tylenol at bedtime, 2 tablets.   9.  Xalatan 0.005% 1 drop both eyes at bedtime.   10.  Tylenol 500 mg, take 2 tabs or 1000 mg every 6 hours.   11.  NovoLog via insulin pump.   12.  Lisinopril 2.5 mg once daily.   13.  Levothyroxine 88 mcg once daily.   14.  Colestid 1 gram twice a day.      REVIEW OF SYSTEMS:  A 10-point systems reviewed and as dictated in the history of present illness.      PHYSICAL EXAMINATION:   VITAL SIGNS:  Temperature 97.7, heart rate 120, respirations 16, blood pressure 190/102, sats are 96% on room air.   GENERAL:  The patient is a 68-year-old  female.  She is pleasant, cooperative, currently in no distress.   HEENT:  Unremarkable.  Pupils equal.  Sclerae are anicteric.  Oropharynx is dry.   NECK:  Veins are flat.  No cervical lymphadenopathy.   PULMONARY:  Lungs are clear to auscultation.   CARDIOVASCULAR:  S1, S2, tachycardic, regular.   ABDOMEN:  Soft, nontender.  No guarding or rebound.   MUSCULOSKELETAL:  No edema.   NEUROLOGIC:  She is able to move all 4 extremities.  Cranial nerves are grossly intact.   SKIN:  Warm, dry, well perfused.     NEUROLOGIC:  The patient is oriented x 3.   PSYCHIATRIC:  Mood and affect stable.      DIAGNOSTIC DATA:  Laboratories as dictated in the history of present illness.      ASSESSMENT:  Rosa Maria Khan is a type 1 diabetic, who likely has a pump failure in mild DKA, admitted for further stabilization of her blood glucose and with hyperglycemia, ketonemia, leukocytosis and glucosuria.      PLAN:   1.  Mild DKA.  The patient denies any current infectious symptoms.  She has not been sick until her pump failure.  She has had prior problems with the  pump and primarily it is usually either the tubing from the insulin pump or the connection.  The patient does not have any other insulin supply right now, and she has to get back to her home in [place].  The patient will be started on Levemir 10 units b.i.d.  We will monitor her blood glucose overnight, and if her blood sugars are too low, we will start her on some dextrose.  The goal is to give her enough basal insulin to get her back home, so she can reconnect her insulin pump and change her tubing.  Her blood glucoses have come down from 570, to 295, to 134.  The patient will be on a moderate carbohydrate sliding scale and a moderate carbohydrate diet.   2.  Leukocytosis.  The patient has no fever, no focal complaints other than nausea, vomiting.  We will repeat the CBC.  She was given ceftriaxone in the Emergency Department 2 grams, but currently she has no focal symptoms.   3.  Hypertension.  We will continue the patient on her lisinopril   4.  Glaucoma.  Continue patient on Xalatan eyedrops.   5.  DVT prophylaxis.  The patient with compression boots.      CODE STATUS:  FULL.         JOURDAN WILBURN MD             D: 2019   T: 2019   MT: LILIANE      Name:     TIA RICHARDS   MRN:      [id number]        Account:      [id number]   :      1950        Admitted:     2019                   Document: [id number]       cc: Kitty CRAIG

## 2019-04-26 NOTE — PLAN OF CARE
Pt remains A/O. Up IND. IvSL. Denies pain. Mod cho. per MD recheck BG at 1600 then call with update. Possible discharge this evening pending BG levels. Continue to monitor.

## 2019-04-27 NOTE — DISCHARGE SUMMARY
[facility]  Discharge Summary        Rosa Maria Khan MRN# [id number]   YOB: 1950 Age: 68 year old     Date of Admission:  4/25/2019  Date of Discharge:  4/26/2019  Admitting Physician:  Esme Benson MD  Discharge Physician: Esme Benson MD  Discharging Service: Hospitalist     Primary Provider: System, Provider Not In  Primary Care Physician Phone Number: None         Discharge Diagnoses/Problem Oriented Hospital Course (Providers):    Rosa Maria Khan was admitted on 4/25/2019 by Esme Benson MD and I would refer you to their history and physical.  The following problems were addressed during her hospitalization:  Assessment & Plan     Rosa Maria Khan is a 68 year old female who was admitted on 4/25/2019.  ASSESSMENT:  Rosa Maria Khan is a type 1 diabetic, who likely has a pump failure in mild DKA, admitted for further stabilization of her blood glucose and with hyperglycemia, ketonemia, leukocytosis and glucosuria.      PLAN:   1.  Mild DKA.  The patient denies any current infectious symptoms.  She has not been sick until her pump failure.  She has had prior problems with the pump and primarily it is usually either the tubing from the insulin pump or the connection.  The patient does not have any other insulin supply right now, and she has to get back to her home in [place].  The patient was started on Levemir 10 units b.i.d.  Blood sugars in the 200s today except this afternoon value at 474   stopped D5 in IVF   Added novolog 5units TID   On sliding scale insulin   Monitor blood sugars closely if controlled in 200s will discharge home today   2. Leucocytosis:  Due to dehydration   Improved today               DVT Prophylaxis: Ambulate every shift  Code Status: Full Code     Disposition: home   Esme Benson MD  [phone] (P)                       Code Status:      Full Code        Brief Hospital Stay Summary Sent Home With Patient in AVS:        Reason for your hospital stay       Hyperglycemia with mild acidosis                 Important Results:      See below         Pending Results:        Unresulted Labs Ordered in the Past 30 Days of this Admission     Date and Time Order Name Status Description    4/25/2019 1237 Blood culture Preliminary     4/25/2019 1237 Blood culture Preliminary             Discharge Instructions and Follow-Up:      Follow-up Appointments     Follow-up and recommended labs and tests       Follow up with primary care provider, Provider Not In System, within 7   days for hospital follow- up.  No follow up labs or test are needed.               Discharge Disposition:      Discharged to home        Discharge Medications:        Discharge Medication List as of 4/26/2019  4:54 PM      START taking these medications    Details   !! insulin aspart (NOVOLOG FLEXPEN) 100 UNIT/ML pen Novolog Flexpen  Give before meals and before bed:  For Pre-Meal Glucose:  140-189 give 1 unit   190-239 give 2 units   240-289 give 3 units   290-339 give 4 units   = or >340 give 5 units     For Bedtime Glucose  200 - 239 give 1 units   240 - 289 give  1.5 units  290 - 339 give 2 units  = or >340 give 2.5 units, Disp-15 mL, R-0, Local Print      !! insulin aspart (NOVOLOG PEN) 100 UNIT/ML pen Inject 5 Units Subcutaneous 3 times daily (with meals), Disp-15 mL, R-0, E-Prescribe      insulin detemir (LEVEMIR PEN) 100 UNIT/ML pen Inject 10 Units Subcutaneous 2 times daily, Disp-30 mL, R-0, E-Prescribe      ondansetron (ZOFRAN-ODT) 4 MG ODT tab Take 1-2 tablets (4-8 mg) by mouth every 8 hours as needed for nausea or vomiting, Disp-20 tablet, R-0, E-Prescribe       !! - Potential duplicate medications found. Please discuss with provider.      CONTINUE these medications which have NOT CHANGED    Details   aspirin 81 MG tablet Take 81 mg by mouth daily , Historical      colestipol (COLESTID) 1 G tablet Take 1 g by mouth 2 times daily, Historical      latanoprost (XALATAN) 0.005 % ophthalmic solution  Place 1 drop into both eyes At Bedtime, Historical      lisinopril (ZESTRIL) 2.5 MG tablet Take 2.5 mg by mouth daily, Historical         STOP taking these medications       insulin aspart (NOVOLOG VIAL) SOLN 100 UNIT/ML Comments:   Reason for Stopping:                 Allergies:         Allergies   Allergen Reactions     Sulfamethoxazole-Trimethoprim      myalgia     Glucosamine-Chondroitin Nausea and Vomiting     Makes patient extremely sick           Consultations This Hospital Stay:      No consultations were requested during this admission               Discharge Time:      Less  than 30 minutes.        Image Results From This Hospital Stay (For Non-EPIC Providers):      No results found for this or any previous visit.          Most Recent Lab Results In EPIC (For Non-EPIC Providers):    Most Recent 3 CBC's:  Recent Labs   Lab Test 04/26/19  0648 04/25/19  1212   WBC 12.7* 21.8*   HGB 13.0 15.2   MCV 93 94    287      Most Recent 3 BMP's:  Recent Labs   Lab Test 04/26/19  0648 04/25/19  1212    133   POTASSIUM 3.9 4.3   CHLORIDE 108 99   CO2 21 19*   BUN 17 24   CR 0.65 0.78   ANIONGAP 10 15*   NASIR 8.5 10.0   * 570*     Most Recent 3 Troponin's:No lab results found.    Invalid input(s): TROP, TROPONINIES  Most Recent 3 INR's:No lab results found.  Most Recent 2 LFT's:  Recent Labs   Lab Test 04/25/19  1212   AST 13   ALT 21   ALKPHOS 173*   BILITOTAL 0.8     Most Recent Cholesterol Panel:No lab results found.  Most Recent 6 Bacteria Isolates From Any Culture (See EPIC Reports for Culture Details):  Recent Labs   Lab Test 04/25/19  1316 04/25/19  1259   CULT No growth after 2 days No growth after 2 days     Most Recent TSH, T4 and HgbA1c:   Recent Labs   Lab Test 04/25/19  1212   A1C 6.6*

## 2019-05-01 LAB
BACTERIA SPEC CULT: NO GROWTH
BACTERIA SPEC CULT: NO GROWTH
Lab: NORMAL
Lab: NORMAL
SPECIMEN SOURCE: NORMAL
SPECIMEN SOURCE: NORMAL

## 2020-02-23 ENCOUNTER — HEALTH MAINTENANCE LETTER (OUTPATIENT)
Age: 70
End: 2020-02-23

## 2020-12-06 ENCOUNTER — HEALTH MAINTENANCE LETTER (OUTPATIENT)
Age: 70
End: 2020-12-06

## 2021-04-11 ENCOUNTER — HEALTH MAINTENANCE LETTER (OUTPATIENT)
Age: 71
End: 2021-04-11

## 2021-08-01 ENCOUNTER — HEALTH MAINTENANCE LETTER (OUTPATIENT)
Age: 71
End: 2021-08-01

## 2021-09-26 ENCOUNTER — HEALTH MAINTENANCE LETTER (OUTPATIENT)
Age: 71
End: 2021-09-26

## 2021-11-21 ENCOUNTER — HEALTH MAINTENANCE LETTER (OUTPATIENT)
Age: 71
End: 2021-11-21

## 2022-03-13 ENCOUNTER — HEALTH MAINTENANCE LETTER (OUTPATIENT)
Age: 72
End: 2022-03-13

## 2022-05-08 ENCOUNTER — HEALTH MAINTENANCE LETTER (OUTPATIENT)
Age: 72
End: 2022-05-08

## 2022-07-03 ENCOUNTER — HEALTH MAINTENANCE LETTER (OUTPATIENT)
Age: 72
End: 2022-07-03

## 2023-01-08 ENCOUNTER — HEALTH MAINTENANCE LETTER (OUTPATIENT)
Age: 73
End: 2023-01-08

## 2023-04-23 ENCOUNTER — HEALTH MAINTENANCE LETTER (OUTPATIENT)
Age: 73
End: 2023-04-23

## 2023-06-02 ENCOUNTER — HEALTH MAINTENANCE LETTER (OUTPATIENT)
Age: 73
End: 2023-06-02

## 2023-09-23 ENCOUNTER — HEALTH MAINTENANCE LETTER (OUTPATIENT)
Age: 73
End: 2023-09-23

## 2024-02-10 ENCOUNTER — HEALTH MAINTENANCE LETTER (OUTPATIENT)
Age: 74
End: 2024-02-10